# Patient Record
Sex: FEMALE | Race: WHITE | URBAN - METROPOLITAN AREA
[De-identification: names, ages, dates, MRNs, and addresses within clinical notes are randomized per-mention and may not be internally consistent; named-entity substitution may affect disease eponyms.]

---

## 2017-09-05 ENCOUNTER — OFFICE VISIT (OUTPATIENT)
Dept: ORTHOPEDIC SURGERY | Age: 22
End: 2017-09-05

## 2017-09-05 VITALS
WEIGHT: 160 LBS | DIASTOLIC BLOOD PRESSURE: 78 MMHG | SYSTOLIC BLOOD PRESSURE: 132 MMHG | BODY MASS INDEX: 24.25 KG/M2 | HEIGHT: 68 IN | HEART RATE: 61 BPM

## 2017-09-05 DIAGNOSIS — M25.561 CHRONIC PAIN OF RIGHT KNEE: Primary | ICD-10-CM

## 2017-09-05 DIAGNOSIS — M22.2X1 PATELLOFEMORAL PAIN SYNDROME OF RIGHT KNEE: ICD-10-CM

## 2017-09-05 DIAGNOSIS — G89.29 CHRONIC PAIN OF RIGHT KNEE: Primary | ICD-10-CM

## 2017-09-05 PROCEDURE — 99203 OFFICE O/P NEW LOW 30 MIN: CPT | Performed by: ORTHOPAEDIC SURGERY

## 2017-09-05 ASSESSMENT — ENCOUNTER SYMPTOMS
GASTROINTESTINAL NEGATIVE: 1
EYES NEGATIVE: 1
RESPIRATORY NEGATIVE: 1

## 2017-12-01 ENCOUNTER — OFFICE VISIT (OUTPATIENT)
Dept: DERMATOLOGY | Age: 22
End: 2017-12-01

## 2017-12-01 DIAGNOSIS — L70.0 ACNE VULGARIS: Primary | ICD-10-CM

## 2017-12-01 DIAGNOSIS — Z78.9 NON-TOBACCO USER: ICD-10-CM

## 2017-12-01 DIAGNOSIS — L81.0 POST-INFLAMMATORY HYPERPIGMENTATION: ICD-10-CM

## 2017-12-01 PROCEDURE — 99202 OFFICE O/P NEW SF 15 MIN: CPT | Performed by: DERMATOLOGY

## 2017-12-01 RX ORDER — DAPSONE 75 MG/G
GEL TOPICAL
Qty: 60 G | Refills: 1 | Status: SHIPPED | OUTPATIENT
Start: 2017-12-01 | End: 2022-03-02

## 2017-12-01 NOTE — PROGRESS NOTES
[facility]) Dermatology  [facility], [address], [place] [address]       Willow Chaudhry  1995    23 y.o. female     Date of Visit: 2017    Chief Complaint:   Chief Complaint   Patient presents with    Acne     cyst like bumps, mainly on face in chin area but does get them on back and neck occasionally, started last few months. Uses Clenziderm MD face wash. I was asked to see this patient by  No ref. provider found. History of Present Illness:  Willow Chaudhry is a 23 y.o. female who presents with the chief complaint of establish care and the followin. Cyst-like, acne bumps to her chin and neck, sometimes small red acne breaks out to back. States these breakouts started a few months ago. Has not tried any Rx strength products prior. Washes face daily with Clenziderm. Does not moisturize regularly. Acne does not flare with menstruation. Regular menses. Denies changes in medications, herbal supplements, vitamins, etc.   Today is a \"normal day\" for her acne. Review of Systems:  Constitutional: Reports general sense of well-being   Skin: No new or changing moles, no history of keloids or hypertrophic scars. Heme: No abnormal bruising or bleeding. Past Medical History, Family History, Surgical History, Medications and Allergies reviewed. Past Skin Hx:   Patient denies past history of melanoma, NMSC, dysplastic nevi, or chronic skin rashes.     Family History   Problem Relation Age of Onset    No Known Problems Mother     No Known Problems Father     No Known Problems Sister     No Known Problems Brother     Diabetes Other     No Known Problems Maternal Aunt     No Known Problems Maternal Uncle     No Known Problems Paternal Aunt     No Known Problems Paternal Uncle     No Known Problems Maternal Grandmother     No Known Problems Maternal Grandfather     No Known Problems Paternal Grandmother     No Known Problems Paternal Grandfather     Anesth Problems Neg Hx    

## 2018-02-02 ENCOUNTER — OFFICE VISIT (OUTPATIENT)
Dept: DERMATOLOGY | Age: 23
End: 2018-02-02

## 2018-02-02 DIAGNOSIS — L70.0 ACNE VULGARIS: Primary | ICD-10-CM

## 2018-02-02 DIAGNOSIS — L81.0 POSTINFLAMMATORY HYPERPIGMENTATION: ICD-10-CM

## 2018-02-02 PROCEDURE — 99213 OFFICE O/P EST LOW 20 MIN: CPT | Performed by: DERMATOLOGY

## 2018-02-02 NOTE — PROGRESS NOTES
contact the office if acne worsens or fails to improve despite months of treatment, new scars, or significantly worsening cysts or nodules. Continue to apply topical Aczone thin layer every morning and continue apply topical next in thin layer nightly to involved areas on the face and neck and back. Avoid picking acne lesions when they appear. Patient call the office if she is to refill before the next appointment. 2. Postinflammatory hyperpigmentation  -In areas of prior active acne  -Reassurance to patient that acne is resolving with current treatment and may take up to 6 months or more for discoloration to subside      Note is transcribed using voice recognition software. Inadvertent computerized transcription errors may be present. Return in about 2 months (around 4/2/2018) for acne.

## 2018-04-06 ENCOUNTER — OFFICE VISIT (OUTPATIENT)
Dept: DERMATOLOGY | Age: 23
End: 2018-04-06

## 2018-04-06 DIAGNOSIS — L70.0 ACNE VULGARIS: Primary | ICD-10-CM

## 2018-04-06 PROCEDURE — 99213 OFFICE O/P EST LOW 20 MIN: CPT | Performed by: DERMATOLOGY

## 2018-06-29 ENCOUNTER — OFFICE VISIT (OUTPATIENT)
Dept: DERMATOLOGY | Age: 23
End: 2018-06-29

## 2018-06-29 DIAGNOSIS — L70.0 ACNE VULGARIS: Primary | ICD-10-CM

## 2018-06-29 PROCEDURE — 99213 OFFICE O/P EST LOW 20 MIN: CPT | Performed by: DERMATOLOGY

## 2018-07-05 ENCOUNTER — HOSPITAL ENCOUNTER (OUTPATIENT)
Dept: ENDOSCOPY | Age: 23
Discharge: HOME | End: 2018-07-05
Attending: INTERNAL MEDICINE | Admitting: INTERNAL MEDICINE

## 2018-07-05 VITALS
SYSTOLIC BLOOD PRESSURE: 110 MMHG | DIASTOLIC BLOOD PRESSURE: 58 MMHG | HEIGHT: 68 IN | WEIGHT: 164.24 LBS | RESPIRATION RATE: 16 BRPM | BODY MASS INDEX: 24.89 KG/M2 | TEMPERATURE: 97.5 F | OXYGEN SATURATION: 99 % | HEART RATE: 48 BPM

## 2018-07-05 LAB — PREGNANCY, URINE: NEGATIVE

## 2018-07-05 RX ORDER — PROMETHAZINE HYDROCHLORIDE 25 MG/ML
6.25 INJECTION, SOLUTION INTRAMUSCULAR; INTRAVENOUS
Status: ACTIVE | OUTPATIENT
Start: 2018-07-05 | End: 2018-07-05

## 2018-07-05 RX ORDER — ONDANSETRON 2 MG/ML
4 INJECTION INTRAMUSCULAR; INTRAVENOUS
Status: ACTIVE | OUTPATIENT
Start: 2018-07-05 | End: 2018-07-05

## 2018-07-05 RX ORDER — LABETALOL HYDROCHLORIDE 5 MG/ML
5 INJECTION, SOLUTION INTRAVENOUS EVERY 10 MIN PRN
Status: DISCONTINUED | OUTPATIENT
Start: 2018-07-05 | End: 2018-07-06 | Stop reason: HOSPADM

## 2018-07-05 RX ORDER — SODIUM CHLORIDE 9 MG/ML
INJECTION, SOLUTION INTRAVENOUS CONTINUOUS
Status: DISCONTINUED | OUTPATIENT
Start: 2018-07-05 | End: 2018-07-06 | Stop reason: HOSPADM

## 2018-07-05 RX ORDER — SODIUM CHLORIDE 0.9 % (FLUSH) 0.9 %
10 SYRINGE (ML) INJECTION PRN
Status: DISCONTINUED | OUTPATIENT
Start: 2018-07-05 | End: 2018-07-06 | Stop reason: HOSPADM

## 2018-07-05 RX ORDER — SODIUM CHLORIDE 0.9 % (FLUSH) 0.9 %
10 SYRINGE (ML) INJECTION EVERY 12 HOURS SCHEDULED
Status: DISCONTINUED | OUTPATIENT
Start: 2018-07-05 | End: 2018-07-06 | Stop reason: HOSPADM

## 2018-07-05 RX ADMIN — SODIUM CHLORIDE: 9 INJECTION, SOLUTION INTRAVENOUS at 07:14

## 2018-07-05 ASSESSMENT — PAIN SCALES - GENERAL
PAINLEVEL_OUTOF10: 0

## 2018-07-05 ASSESSMENT — PAIN - FUNCTIONAL ASSESSMENT: PAIN_FUNCTIONAL_ASSESSMENT: 0-10

## 2018-07-05 NOTE — OP NOTE
Colonoscopy Procedure Note      Patient: Chintan Wahl  : 1995  Acct#:     Procedure: Colonoscopy    Date:  2018    Surgeon:  Sydnee Black MD    Referring Physician:  Martin Hall MD    Previous Colonoscopy: NO  Date: N/A  Greater than 3 years: N/A    Preoperative Diagnosis:  1. Rectal Bleeding     Postoperative Diagnosis: 1. Prolapsing Internal Hemorrhoid in RA Position 2. Colon otherwise normal    Consent:  The patient or their legal guardian has signed a consent, and is aware of the potential risks, benefits, alternatives, and potential complications of this procedure. These include, but are not limited to hemorrhage, bleeding, post procedural pain, perforation, phlebitis, aspiration, hypotension, hypoxia, cardiovascular events such as arryhthmia, and possibly death. Additionally, the possibility of missed colonic polyps and interval colon cancer was discussed in the consent. Anesthesia:  The patient was administered IV propofol per anesthesiology team.  Please see their operative records for full details. Procedure: An informed consent was obtained from the patient after explanation of indications, benefits, possible risks and complications of the procedure. The patient was then taken to the endoscopy suite, placed in the left lateral decubitus position, and the above IV anesthesia was administered. A digital rectal examination was performed and revealed negative without mass, lesions or tenderness, internal hemorrhoids noted in RA position (Grade 2). The Olympus video colonoscope was placed in the patient's rectum under digital direction and advanced to the cecum. The cecum was identified by characteristic anatomy and ballottment. The preparation was excellent. The ileocecal valve was identified. The terminal ileum was normal appearing. The scope was then withdrawn back through the cecum, ascending, transverse, descending, sigmoid colon, and rectum.

## 2018-07-05 NOTE — H&P
Pre-operative History and Physical    Patient: Javier Thomas  : 1995  Acct#:     Intended Procedure:  Colonoscopy    HISTORY OF PRESENT ILLNESS:  The patient is a 23 y.o. female  who presents for/due to Rectal Bleeding      Past Medical History:    History reviewed. No pertinent past medical history. Past Surgical History:        Procedure Laterality Date    ANTERIOR CRUCIATE LIGAMENT REPAIR Right     TONSILLECTOMY AND ADENOIDECTOMY       Medications Prior to Admission:   Current Outpatient Prescriptions on File Prior to Encounter   Medication Sig Dispense Refill    Dapsone (ACZONE) 7.5 % GEL Apply thin layer to affected areas every morning. 60 g 1    Clindamycin Phos-Benzoyl Perox (ONEXTON) 1.2-3.75 % GEL Apply thin layer to affected areas every night as tolerated. 50 g 1     No current facility-administered medications on file prior to encounter. Allergies:  Patient has no known allergies. Social History:   TOBACCO:   reports that she has never smoked. She has never used smokeless tobacco.  ETOH:   reports that she drinks alcohol. DRUGS:   reports that she does not use drugs. PHYSICAL EXAM:      Vital Signs: /72   Pulse 58   Temp 98.5 °F (36.9 °C) (Temporal)   Resp 16   Ht 5' 8\" (1.727 m)   Wt 164 lb 3.9 oz (74.5 kg)   LMP 2018   SpO2 99%   BMI 24.97 kg/m²    Airway: No stridor or wheezing noted. Good air movement  Pulmonary: without wheezes. Clear to auscultation  Cardiac:regular rate and rhythm without loud murmurs  Abdomen:soft, nontender,  Bowel sounds present    Pre-Procedure Assessment / Plan:  1) Colonoscopy     ASA Grade:  ASA 1 - Normal health patient  Mallampati Classification:  Class II    Level of Sedation Plan:Deep sedation    Post Procedure plan: Return to same level of care    I assessed the patient and find that the patient is in satisfactory condition to proceed with the planned procedure and sedation plan.     I have explained the risk, benefits, and alternatives to the procedure; the patient understands and agrees to proceed.        Reed Spencer MD  7/5/2018

## 2018-07-05 NOTE — ANESTHESIA PRE-OP
1824  Pulse  Av  Min: 62   Min taken time: 18  Max: 62   Max taken time: 18  Resp  Av  Min: 12   Min taken time: 18  Max: 16   Max taken time: 18  BP  Min: 115/72   Min taken time: 18  Max: 115/72   Max taken time: 18  SpO2  Av %  Min: 99 %   Min taken time: 18  Max: 99 %   Max taken time: 18    BP Readings from Last 3 Encounters:   18 115/72   17 132/78     BMI  Body mass index is 24.97 kg/m². Estimated body mass index is 24.97 kg/m² as calculated from the following:    Height as of this encounter: 5' 8\" (1.727 m). Weight as of this encounter: 164 lb 3.9 oz (74.5 kg). CBC   Lab Results   Component Value Date    WBC 14.0 2011    RBC 4.72 2011    HGB 13.9 2011    HCT 41.0 2011    MCV 87.0 2011    RDW 13.1 2011     2011     CMP  No results found for: NA, K, CL, CO2, BUN, CREATININE, GFRAA, AGRATIO, LABGLOM, GLUCOSE, PROT, CALCIUM, BILITOT, ALKPHOS, AST, ALT  BMP  No results found for: NA, K, CL, CO2, BUN, CREATININE, CALCIUM, GFRAA, LABGLOM, GLUCOSE  POCGlucose  No results for input(s): GLUCOSE in the last 72 hours.    Coags  No results found for: PROTIME, INR, APTT  HCG (If Applicable) No results found for: PREGTESTUR, PREGSERUM, HCG, HCGQUANT   ABGs No results found for: PHART, PO2ART, ZKA6WDJ, WIZ7QXJ, BEART, F3FGHGNS   Type & Screen (If Applicable)  No results found for: LABABO, LABRH                         BMI: Wt Readings from Last 3 Encounters:       NPO Status:8 hours                          Anesthesia Evaluation  Patient summary reviewed no history of anesthetic complications:   Airway: Mallampati: II  TM distance: >3 FB   Neck ROM: full   Dental: normal exam         Pulmonary:Negative Pulmonary ROS and normal exam                               Cardiovascular:Negative CV ROS  Exercise tolerance: good (>4 METS),           Rhythm: regular  Rate: normal           Beta Blocker:  Not on Beta Blocker         Neuro/Psych:   Negative Neuro/Psych ROS              GI/Hepatic/Renal: Neg GI/Hepatic/Renal ROS  (+) bowel prep,           Endo/Other: Negative Endo/Other ROS                    Abdominal:           Vascular: negative vascular ROS. Anesthesia Plan      MAC     ASA 1       Induction: intravenous. MIPS: Postoperative opioids intended and Prophylactic antiemetics administered. Anesthetic plan and risks discussed with patient and mother. Plan discussed with CRNA. This pre-anesthesia assessment may be used as a history and physical.    DOS STAFF ADDENDUM:    Pt seen and examined, chart reviewed (including anesthesia, drug and allergy history). No interval changes to history and physical examination. Anesthetic plan, risks, benefits, alternatives, and personnel involved discussed with patient. Patient verbalized an understanding and agrees to proceed.       Denise Lazaro MD  July 5, 2018  7:00 AM

## 2018-07-05 NOTE — PROGRESS NOTES
Alert and oriented. No c/o. Vss. abd soft. Tolerated sitting up and po fluids and cookies well. Family at bedside. Understands discharge instructions.

## 2020-07-14 ENCOUNTER — OFFICE VISIT (OUTPATIENT)
Dept: PRIMARY CARE CLINIC | Age: 25
End: 2020-07-14

## 2020-07-14 PROCEDURE — 99211 OFF/OP EST MAY X REQ PHY/QHP: CPT | Performed by: NURSE PRACTITIONER

## 2020-07-18 LAB
SARS-COV-2: NOT DETECTED
SOURCE: NORMAL

## 2021-01-12 ENCOUNTER — TELEPHONE (OUTPATIENT)
Dept: DERMATOLOGY | Age: 26
End: 2021-01-12

## 2021-01-12 NOTE — TELEPHONE ENCOUNTER
Mom is calling to schedule laser hair removal on face. Please return call . Requesting March and around lunch time.

## 2021-03-04 ENCOUNTER — PROCEDURE VISIT (OUTPATIENT)
Dept: DERMATOLOGY | Age: 26
End: 2021-03-04

## 2021-03-04 VITALS — TEMPERATURE: 98.6 F

## 2021-03-04 DIAGNOSIS — L68.9 EXCESSIVE HAIR: Primary | ICD-10-CM

## 2021-03-04 PROCEDURE — DM01515 PIGMENTED LASER 11-20 LESIONS: Performed by: DERMATOLOGY

## 2021-03-04 NOTE — PROGRESS NOTES
Laser Procedure Note       Roshan Escobar   YOB: 1995    DATE OF VISIT:  3/4/2021  Chief Complaint   Patient presents with    Laser Treatment     jawlines and cheeks-lasered x 3 mos ago x 9 treatments a few years ago     *mom is a patient  *has seen Dr. Nubia Arzola in the past for skin check/acne. LASER: GentleLase #1  DIAGNOSIS: excess hair    Here for removal of dark facial and neck hair. Treated with ~ 9 trx in the past by other practitioner. No probs with trx and has had some improvement but still needs to shave off hairs weekly. Last treated ~ 3 mos ago. No significant chest hair growth or voice changes. Has very dark scalp hair and mom with hx of dark facial hair. Not tan. Works in "[employer]. We discussed treatment options, including no treatment as well as the following:  - need for multiple treatments and risk of incomplete clearance, recurrence  - risk of erythema, edema, purpura, dyspigmentation and scarring    PATIENT IDENTIFIED PER PROTOCOL: yes  LOCATION(S): face and neck  VERIFIED AND MARKED: yes  TECHNIQUES, RISKS, BENEFITS AND ALTERNATIVES EXPLAINED: yes  CONSENT SIGNED, WITNESSED AND DATED: yes        OPERATIVE REPORT    DIAGNOSIS, LOCATION, PROCEDURE RECONFIRMED: yes   APPROPRIATE EYE PROTECTION for PATIENT: yes  APPROPRIATE EYE PROTECTION for PROVIDER and OTHERS PRESENT: yes  ANESTHESIA/PRE-OP MEDICATIONS: none  LASER SETTINGS:  (1)  WAVELENGTH: 755 - GL   LENS: 18 mm FLUENCE: 18 J   COOLIN-20    PROCEDURE NOTE:  Full lower face, neck treated with single pass with vaporization of hairs and mild erythema, perifollicuar edema at endpoint. POST-OPERATIVE CARE/DISPOSITION: ice pack prn  COMPLICATIONS: none  MEDICATIONS: none  WOUND CARE INSTRUCTIONS PROVIDED: yes    F/u 1-3 mos.     250 - may decresae at f/u if smaller area

## 2021-05-06 ENCOUNTER — PROCEDURE VISIT (OUTPATIENT)
Dept: DERMATOLOGY | Age: 26
End: 2021-05-06

## 2021-05-06 VITALS — TEMPERATURE: 98.4 F

## 2021-05-06 DIAGNOSIS — L68.9 EXCESSIVE HAIR: Primary | ICD-10-CM

## 2021-05-06 PROCEDURE — DM01515 PIGMENTED LASER 11-20 LESIONS: Performed by: DERMATOLOGY

## 2021-08-05 ENCOUNTER — PROCEDURE VISIT (OUTPATIENT)
Dept: DERMATOLOGY | Age: 26
End: 2021-08-05

## 2021-08-05 VITALS — TEMPERATURE: 98.1 F

## 2021-08-05 DIAGNOSIS — L68.9 EXCESSIVE HAIR: Primary | ICD-10-CM

## 2021-08-05 PROCEDURE — DM01515 PIGMENTED LASER 11-20 LESIONS: Performed by: DERMATOLOGY

## 2021-08-05 NOTE — PROGRESS NOTES
Laser Procedure Note       Brenda Collado   YOB: 1995    DATE OF VISIT:  2021  Chief Complaint   Patient presents with    Laser Treatment     facial hair     *mom is a patient  *has seen Dr. Julian Jimenez in the past for skin check/acne. LASER: GentleLase #3 - last seen   DIAGNOSIS: excess hair    Here for removal of dark facial and neck hair. Treated with ~ 9 trx in the past by other practitioner. No probs with trx and has had some improvement but still needs to shave off hairs weekly. Last treated by me ~  (3 mos ago) without complications and w good rsults. Hasn't had to shave anything in btwn until just a few weeks ago. No significant chest hair growth or voice changes. Has very dark scalp hair and mom with hx of dark facial hair. Somewhat tan. Works in [employer] - [address] and [employer]. We discussed treatment options, including no treatment as well as the following:  - need for multiple treatments and risk of incomplete clearance, recurrence  - risk of erythema, edema, purpura, dyspigmentation and scarring    PATIENT IDENTIFIED PER PROTOCOL: yes  LOCATION(S): face and neck  VERIFIED AND MARKED: yes  TECHNIQUES, RISKS, BENEFITS AND ALTERNATIVES EXPLAINED: yes  CONSENT SIGNED, WITNESSED AND DATED: yes        OPERATIVE REPORT    DIAGNOSIS, LOCATION, PROCEDURE RECONFIRMED: yes   APPROPRIATE EYE PROTECTION for PATIENT: yes  APPROPRIATE EYE PROTECTION for PROVIDER and OTHERS PRESENT: yes  ANESTHESIA/PRE-OP MEDICATIONS: none  LASER SETTINGS:  (1)  WAVELENGTH: 755 - GL   LENS: 18 mm FLUENCE: 18 J   COOLIN-20    PROCEDURE NOTE:  Full lower face, neck treated with single pass with vaporization of hairs and mild erythema, perifollicuar edema at endpoint. POST-OPERATIVE CARE/DISPOSITION: ice pack prn  COMPLICATIONS: none  MEDICATIONS: none  WOUND CARE INSTRUCTIONS PROVIDED: yes    F/u 2-3 mos.     250 - may decrease at f/u if smaller areas    Also discussed hair removal on the upper medial thighs - will do this with next appt when not tan.

## 2021-11-04 ENCOUNTER — PROCEDURE VISIT (OUTPATIENT)
Dept: DERMATOLOGY | Age: 26
End: 2021-11-04

## 2021-11-04 VITALS — TEMPERATURE: 97.7 F

## 2021-11-04 DIAGNOSIS — L68.9 EXCESSIVE HAIR: Primary | ICD-10-CM

## 2021-11-04 PROCEDURE — DM01530 PIGMENTED LASER LARGE AREA (EG. CHEST, ARMS, NECK, SHOULDERS): Performed by: DERMATOLOGY

## 2021-11-04 NOTE — PROGRESS NOTES
Laser Procedure Note       Ruben Brown   YOB: 1995    DATE OF VISIT:  2021  Chief Complaint   Patient presents with    Laser Treatment     GL on face and legs     *mom is a patient  *has seen Dr. Casper Smith in the past for skin check/acne. LASER: GentleLase #4 - last seen   And #1 - thighs  DIAGNOSIS: excess hair    Here for removal of dark facial and neck hair. Treated with ~ 9 trx in the past by other practitioner. No probs with trx and has had some improvement but still needs to shave off hairs weekly. Last treated by me ~  (3 mos ago) without complications and w good rsults. Hasn't had to shave anything in btwn until just a few weeks ago. Also would like trx of medial and post thighs. No significant chest hair growth or voice changes. Has very dark scalp hair and mom with hx of dark facial hair. Somewhat tan. Works in [employer] - [address] and [employer]. We discussed treatment options, including no treatment as well as the following:  - need for multiple treatments and risk of incomplete clearance, recurrence  - risk of erythema, edema, purpura, dyspigmentation and scarring    PATIENT IDENTIFIED PER PROTOCOL: yes  LOCATION(S): face and neck  VERIFIED AND MARKED: yes  TECHNIQUES, RISKS, BENEFITS AND ALTERNATIVES EXPLAINED: yes  CONSENT SIGNED, WITNESSED AND DATED: yes        OPERATIVE REPORT    DIAGNOSIS, LOCATION, PROCEDURE RECONFIRMED: yes   APPROPRIATE EYE PROTECTION for PATIENT: yes  APPROPRIATE EYE PROTECTION for PROVIDER and OTHERS PRESENT: yes  ANESTHESIA/PRE-OP MEDICATIONS: none  LASER SETTINGS:  (1)  WAVELENGTH: 755 - GL   LENS: 18 mm FLUENCE: 18 J - face/neck   COOLIN-20  (2)  WAVELENGTH: 755 - GL   LENS: 18 mm FLUENCE: 16 J - legs   COOLIN-20      PROCEDURE NOTE:  Full lower face, neck treated with single pass with vaporization of hairs and mild erythema, perifollicuar edema at endpoint.   Medial and posterior thighs treatecd with single pass with vaporization of hairs and mild erythema, perifollicuar edema at endpoint. POST-OPERATIVE CARE/DISPOSITION: ice pack prn  COMPLICATIONS: none  MEDICATIONS: none  WOUND CARE INSTRUCTIONS PROVIDED: yes    F/u 2-3 mos.     400 - thighs + face    Also discussed botox briefly - glabella and FH (200-240 + 150) - 12/u

## 2022-02-10 ENCOUNTER — TELEPHONE (OUTPATIENT)
Dept: DERMATOLOGY | Age: 27
End: 2022-02-10

## 2022-02-10 NOTE — TELEPHONE ENCOUNTER
Dr Faheem Cunha patient  Pt c/b #[phone]  Pt stated  Calling to schedule laser hair removal appt.

## 2022-03-02 ENCOUNTER — PROCEDURE VISIT (OUTPATIENT)
Dept: DERMATOLOGY | Age: 27
End: 2022-03-02

## 2022-03-02 VITALS — TEMPERATURE: 98.2 F

## 2022-03-02 DIAGNOSIS — L68.9 EXCESSIVE HAIR: Primary | ICD-10-CM

## 2022-03-02 PROCEDURE — DM01530 PIGMENTED LASER LARGE AREA (EG. CHEST, ARMS, NECK, SHOULDERS): Performed by: DERMATOLOGY

## 2022-03-02 NOTE — PROGRESS NOTES
Laser Procedure Note       Brandy Oviedo   YOB: 1995    DATE OF VISIT:  3/2/2022  Chief Complaint   Patient presents with    Procedure     *mom is a patient; sister is a patient  *has seen Dr. Fabian Bradley in the past for skin checks/acne. LASER: Zeeshan last seen   DIAGNOSIS: excess hair - thighs (#2) and face    Here for removal of dark facial and neck hair + thighs. Treated with ~ 9 trx in the past by other practitioner. No probs with trx and has had some improvement but still needs to shave off hairs weekly. Last treated by me ~  without complications and w good rsults. Would like trx of medial and post thighs. No significant chest hair growth or voice changes. Has very dark scalp hair and mom with hx of dark facial hair. Somewhat tan. Works in [address] and [employer]. We discussed treatment options, including no treatment as well as the following:  - need for multiple treatments and risk of incomplete clearance, recurrence  - risk of erythema, edema, purpura, dyspigmentation and scarring    PATIENT IDENTIFIED PER PROTOCOL: yes  LOCATION(S): face and neck + upper legs  VERIFIED AND MARKED: yes  TECHNIQUES, RISKS, BENEFITS AND ALTERNATIVES EXPLAINED: yes  CONSENT SIGNED, WITNESSED AND DATED: yes        OPERATIVE REPORT    DIAGNOSIS, LOCATION, PROCEDURE RECONFIRMED: yes   APPROPRIATE EYE PROTECTION for PATIENT: yes  APPROPRIATE EYE PROTECTION for PROVIDER and OTHERS PRESENT: yes  ANESTHESIA/PRE-OP MEDICATIONS: none  LASER SETTINGS:  (1)  WAVELENGTH: 755 - GL   LENS: 18 mm FLUENCE: 18 J - face/neck   COOLIN-20  (2)  WAVELENGTH: 755 - GL   LENS: 18 mm FLUENCE: 16-18 J - legs   COOLIN-20      PROCEDURE NOTE:  Medial and posterior thighs treatecd with single pass with vaporization of hairs and mild erythema, ++ perifollicuar edema at endpoint.     POST-OPERATIVE CARE/DISPOSITION: ice pack prn  COMPLICATIONS: none  MEDICATIONS: none  WOUND CARE INSTRUCTIONS PROVIDED: yes    F/u 2-3 mos.     400 - upper legs AND FACE    Have also discussed botox briefly - glabella and FH (200-240 + 150) - 12/u

## 2022-03-10 ENCOUNTER — PATIENT MESSAGE (OUTPATIENT)
Dept: DERMATOLOGY | Age: 27
End: 2022-03-10

## 2022-03-10 NOTE — TELEPHONE ENCOUNTER
From: Crescencio Minaya  To: Dr. Mario Liao: 3/10/2022 7:53 AM EST  Subject: Balance in Account    Hi there,   I have a balance on my account for the services I had done on 3/2. I had paid that invoice over the phone and there is notes in the system that it had been paid. Please advise . Thank you!    Dina Gil

## 2022-08-19 ENCOUNTER — TELEPHONE (OUTPATIENT)
Dept: DERMATOLOGY | Age: 27
End: 2022-08-19

## 2022-11-03 ENCOUNTER — PROCEDURE VISIT (OUTPATIENT)
Dept: DERMATOLOGY | Age: 27
End: 2022-11-03

## 2022-11-03 DIAGNOSIS — L68.9 EXCESSIVE HAIR: Primary | ICD-10-CM

## 2022-11-03 PROCEDURE — DM01530 PIGMENTED LASER LARGE AREA (EG. CHEST, ARMS, NECK, SHOULDERS): Performed by: DERMATOLOGY

## 2022-11-03 NOTE — PROGRESS NOTES
Laser Procedure Note       Gal Ponce   YOB: 1995    DATE OF VISIT:  11/3/2022  Chief Complaint   Patient presents with    Procedure     *mom is a patient; sister is a patient  *had seen Dr. May President in the past for skin checks/acne    LASER: GentleLase - last seen 3-2022  DIAGNOSIS: excess hair - thighs (#3) and face    Here for removal of dark facial and neck hair + thighs. Treated with ~ 9 trx in the past by other practitioner. No probs with trx and has had some improvement but still needs to shave off hairs weekly. Last treated by me ~  and  without complications and w good rsults. Would like trx of medial and post thighs. No significant chest hair growth or voice changes. Has very dark scalp hair and mom with hx of dark facial hair. Works in [employer] - [address] and [employer]. We discussed treatment options, including no treatment as well as the following:  - need for multiple treatments and risk of incomplete clearance, recurrence  - risk of erythema, edema, purpura, dyspigmentation and scarring    PATIENT IDENTIFIED PER PROTOCOL: yes  LOCATION(S): face and neck + upper legs  VERIFIED AND MARKED: yes  TECHNIQUES, RISKS, BENEFITS AND ALTERNATIVES EXPLAINED: yes  CONSENT SIGNED, WITNESSED AND DATED: yes        OPERATIVE REPORT    DIAGNOSIS, LOCATION, PROCEDURE RECONFIRMED: yes   APPROPRIATE EYE PROTECTION for PATIENT: yes  APPROPRIATE EYE PROTECTION for PROVIDER and OTHERS PRESENT: yes  ANESTHESIA/PRE-OP MEDICATIONS: none  LASER SETTINGS:  (1)  WAVELENGTH: 755 - GL   LENS: 18 mm FLUENCE: 18 J - face/neck   COOLIN-20  (2)  WAVELENGTH: 755 - GL   LENS: 18 mm FLUENCE: 16-18 J (18 J today) - legs   COOLIN-20    PROCEDURE NOTE:  Medial and posterior thighs treatecd with single pass with vaporization of hairs and mild erythema, ++ perifollicuar edema at endpoint.     POST-OPERATIVE CARE/DISPOSITION: ice pack prn  COMPLICATIONS: none  MEDICATIONS: